# Patient Record
Sex: MALE | Race: WHITE | NOT HISPANIC OR LATINO | Employment: OTHER | URBAN - METROPOLITAN AREA
[De-identification: names, ages, dates, MRNs, and addresses within clinical notes are randomized per-mention and may not be internally consistent; named-entity substitution may affect disease eponyms.]

---

## 2022-03-09 ENCOUNTER — TELEPHONE (OUTPATIENT)
Dept: RADIATION ONCOLOGY | Facility: HOSPITAL | Age: 72
End: 2022-03-09

## 2022-03-11 ENCOUNTER — HOSPITAL ENCOUNTER (OUTPATIENT)
Dept: RADIATION ONCOLOGY | Facility: HOSPITAL | Age: 72
Setting detail: RADIATION/ONCOLOGY SERIES
End: 2022-03-11

## 2022-03-14 ENCOUNTER — TELEPHONE (OUTPATIENT)
Dept: RADIATION ONCOLOGY | Facility: HOSPITAL | Age: 72
End: 2022-03-14

## 2022-03-14 PROBLEM — C61 STAGE IV ADENOCARCINOMA OF PROSTATE: Status: ACTIVE | Noted: 2022-03-14

## 2022-03-14 PROBLEM — Z19.2 METASTATIC CASTRATION-RESISTANT ADENOCARCINOMA OF PROSTATE: Status: ACTIVE | Noted: 2022-03-14

## 2022-03-14 PROBLEM — C79.51 SECONDARY MALIGNANT NEOPLASM OF BONE: Status: ACTIVE | Noted: 2022-03-14

## 2022-03-14 RX ORDER — TAMSULOSIN HYDROCHLORIDE 0.4 MG/1
1 CAPSULE ORAL DAILY
COMMUNITY

## 2022-03-14 RX ORDER — PROMETHAZINE HYDROCHLORIDE 25 MG/1
25 TABLET ORAL EVERY 6 HOURS PRN
COMMUNITY

## 2022-03-14 RX ORDER — ATORVASTATIN CALCIUM 20 MG/1
20 TABLET, FILM COATED ORAL DAILY
COMMUNITY

## 2022-03-14 RX ORDER — SILDENAFIL 100 MG/1
100 TABLET, FILM COATED ORAL DAILY PRN
COMMUNITY

## 2022-03-14 NOTE — PROGRESS NOTES
RADIATION THERAPY CONSULT NOTE    NAME: Rishi Yap  YOB: 1950  MRN #: [id number]  DATE OF SERVICE: 3/15/2022  REFERRING PROVIDER: Samson DUNN APRN  PRIMARY CARE PROVIDER: Alba Og MD    DIAGNOSIS:  Stage IV Prostate Adenocarcinoma, Andry 10 (5+5), 10/10 cores high volumes disease  Encounter Diagnoses   Name Primary?   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  I was asked to see the patient at the request of the referring provider noted below for advice and recommendations regarding this diagnosis and the role of radiation therapy.                              REQUESTING PHYSICIAN:  VIGNESH Beth ([facility])    RECORDS OBTAINED:  Records of the patients history including those obtained from the referring provider were reviewed and summarized in detail.    HISTORY OF PRESENT ILLNESS:  Rishi Yap is a 71 y.o. male with known stage IV Prostate cancer, GS 10 and LUTS.  He had an excellent response to hormones and also was started on Taxotere but had a severe allergic reaction with the first dose; in 11/22/21 he had received carboPt AUC 5 q3k and tolerated ok. He completed a total of 4 cycles.    PSA Timeline ([facility]):  04/21/2010   3.50 ng/mL  08/03/2020   70.3 ng/mL  Started ADT  10/05/2020   7.52 ng/mL  01/11/2021   9.06 ng/mL  02/18/2021   3.94 ng/mL  04/19/2021   0.42 ng/mL  07/19/2021   0.16 ng/mL  10/13/2021   1.08 ng/mL  12/27/2021   3.70 ng/mL  02/24/2022   5.53 ng/mL    CT CAP 2/24/2022: Noted a mixed nodular soft tissue and fat density lesion within the left paraspinal musculature centered at the level of L2, enlarging and now measuring 6.6 cm.    Stable subcm RP nodes.  Sclerotic lesions throughout the lumbar spine, sacrum, pelvic bones and proximal femurs noted.    His PSA increased 2/24/2022 as noted representing  castrate resistance.  He was having pain/grwoth in the paraspinal mass/musculature and recommended palliative XRT.    He is being switched to Enzalutamide 160 mg daily and continues on Eligard.    The following portions of the patient's history were reviewed and updated as appropriate: allergies, current medications, past family history, past medical history, past social history, past surgical history and problem list. Reviewed with the patient and remain unchanged.    PAST MEDICAL HISTORY:  he has a past medical history of Alcohol dependence (HCC), Basal cell carcinoma of skin, Bilateral hearing loss, Encounter for palliative care, Hyperlipidemia, Hypertension, Malignant neoplasm of prostate (HCC), Personal history of colonic polyps, and Secondary malignant neoplasm of bone (HCC).    MEDICATIONS:    Current Outpatient Medications:   •  atorvastatin (LIPITOR) 20 MG tablet, Take 20 mg by mouth Daily., Disp: , Rfl:   •  metoprolol tartrate (LOPRESSOR) 25 MG tablet, Take 12.5 mg by mouth 2 (Two) Times a Day., Disp: , Rfl:   •  promethazine (PHENERGAN) 25 MG tablet, Take 25 mg by mouth Every 6 (Six) Hours As Needed for Nausea or Vomiting., Disp: , Rfl:   •  sildenafil (VIAGRA) 100 MG tablet, Take 100 mg by mouth Daily As Needed for Erectile Dysfunction., Disp: , Rfl:   •  tamsulosin (FLOMAX) 0.4 MG capsule 24 hr capsule, Take 1 capsule by mouth Daily., Disp: , Rfl:     ALLERGIES:    Allergies   Allergen Reactions   • Taxotere [Docetaxel] Anaphylaxis   • Penicillins Rash       PAST SURGICAL HISTORY:  Vasectomy and appendectomy    PREVIOUS RADIOTHERAPY OR CHEMOTHERAPY:  Chemotherapy; no prior XRT    FAMILY HISTORY:  hisfamily history includes Stomach cancer in his mother.    SOCIAL HISTORY:  he reports that he has never smoked. He has never used smokeless tobacco. He reports current alcohol use of about 24.0 standard drinks of alcohol per week. He reports that he does not use drugs.    PAIN AND PAIN MANAGEMENT:  Pain 2/10  in mid L spine.  Vitals:    03/15/22 1004   BP: 125/71   Pulse: 75   Resp: 16   SpO2: 99%   Weight: 105 kg (231 lb 12.8 oz)   PainSc:   2       NUTRITIONAL STATUS:   no issues    KPS:  80:  Normal activity with effort; some signs or symptoms  PHQ-9 Total Score: distress tool completed.    REVIEW OF SYSTEMS: pain is noted.  General: No fevers, chills, weight change, or drenching night sweats. Skin: No rashes or jaundice.  HEENT: No change in vision or hearing, no headaches.  Neck: No dysphagia or masses.  Heme/Lymph: No easy bruising or bleeding.  Respiratory System: No shortness of breath or cough.  Cardiovascular: No chest pain, palpitations, or dyspnea on exertion.  - Pacemaker. GI: No nausea, vomiting, diarrhea, melena, or hematochezia.  : No dysuria or hematuria.  Endocrine: No heat or cold intolerance. Musculoskeletal: No myalgias or arthralgias.  Neuro: No weakness, numbness, syncope, or seizures. Psych: No mood changes or depression. Ext: Denies swelling.      Objective   VITAL SIGNS:  Vitals:    03/15/22 1004   BP: 125/71   Pulse: 75   Resp: 16   SpO2: 99%   Weight: 105 kg (231 lb 12.8 oz)   PainSc:   2       PHYSICAL EXAM:  GENERAL:  No apparent distress. Sitting comfortably in room.    HEENT:  Normocephalic, atraumatic. Pupils are equal, round, reactive to light. Sclera anicteric. Conjunctiva not injected. Oropharynx without erythema, ulcerations or thrush.   NECK:  Supple with no masses.  LYMPHATIC:  No cervical, supraclavicular or axillary adenopathy appreciated bilaterally.   CARDIOVASCULAR:  S1 & S2 detected; no murmurs, rubs or gallops.  CHEST:  Clear to auscultation bilaterally; no wheezes, crackles or rubs. Work of breathing normal.  ABDOMEN:  Bowel sounds present. Abdomen is soft, nontender, nondistended.   MUSCULOSKELETAL:  No tenderness to palpation along the spine or scapulae. Normal range of motion.  EXTREMITIES:  No clubbing, cyanosis, edema.  SKIN:  No erythema, rashes, ulcerations noted.    NEUROLOGIC:  Cranial nerves II-XII grossly intact bilaterally. No focal neurologic deficits.  PSYCHIATRIC:  Alert, aware, and appropriate.      PERTINENT IMAGING/PATHOLOGY/LABS (Medical Decision Making):      COORDINATION OF CARE:  A copy of this note is sent to the referring provider.    PATHOLOGY (Reviewed): as noted above.    IMAGING (Reviewed): independently reviewed, and shared with the patient.    LABS (Reviewed): as noted above.    Assessment/Plan   ASSESSMENT AND PLAN:    1. 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 Secondary malignant neoplasm of bone (HCC)       -Palliative XRT recommended--pain and growth in L2/soft tissue area as noted.  -CT sim  And 30 Gy in 10 fractions recommended.    No  or GI issues. No pain in hips and ambulating great.  Chemo resolved most of his symptoms--we could offer radiation therapy to the area but improved and asymptomatic.  Also starting Xtandi.    Dosing 30 Gy due to soft tissue component as well.    This assessment comes from my review of the imaging, pathology, physician notes and other pertinent information as mentioned.    DISPOSITION:  CT simulation on 03/16/2022.      TIME SPENT WITH PATIENT: I spent 45 minutes caring for Rishi on this date of service. This time includes time spent by me in the following activities: preparing for the visit, reviewing tests, obtaining and/or reviewing a separately obtained history, performing a medically appropriate examination and/or evaluation, counseling and educating the patient/family/caregiver, documenting information in the medical record and care coordination         CC:  Alba Og MD Robin Szczapinski, APRN John A Cox, MD  3/15/2022  9:19 AM EDT

## 2022-03-15 ENCOUNTER — CONSULT (OUTPATIENT)
Dept: RADIATION ONCOLOGY | Facility: HOSPITAL | Age: 72
End: 2022-03-15

## 2022-03-15 VITALS
SYSTOLIC BLOOD PRESSURE: 125 MMHG | DIASTOLIC BLOOD PRESSURE: 71 MMHG | OXYGEN SATURATION: 99 % | WEIGHT: 231.8 LBS | HEART RATE: 75 BPM | RESPIRATION RATE: 16 BRPM

## 2022-03-15 DIAGNOSIS — C61 METASTATIC CASTRATION-RESISTANT ADENOCARCINOMA OF PROSTATE: Primary | ICD-10-CM

## 2022-03-15 DIAGNOSIS — Z19.2 METASTATIC CASTRATION-RESISTANT ADENOCARCINOMA OF PROSTATE: Primary | ICD-10-CM

## 2022-03-15 DIAGNOSIS — C79.51 SECONDARY MALIGNANT NEOPLASM OF BONE: ICD-10-CM

## 2022-03-15 PROCEDURE — 99204 OFFICE O/P NEW MOD 45 MIN: CPT | Performed by: RADIOLOGY

## 2022-03-15 PROCEDURE — G0463 HOSPITAL OUTPT CLINIC VISIT: HCPCS | Performed by: RADIOLOGY

## 2022-03-16 ENCOUNTER — HOSPITAL ENCOUNTER (OUTPATIENT)
Dept: RADIATION ONCOLOGY | Facility: HOSPITAL | Age: 72
Discharge: HOME | End: 2022-03-16

## 2022-03-16 PROCEDURE — 77290 THER RAD SIMULAJ FIELD CPLX: CPT | Performed by: RADIOLOGY

## 2022-03-16 PROCEDURE — 77334 RADIATION TREATMENT AID(S): CPT | Performed by: RADIOLOGY

## 2022-03-23 PROCEDURE — 77334 RADIATION TREATMENT AID(S): CPT | Performed by: RADIOLOGY

## 2022-03-23 PROCEDURE — 77295 3-D RADIOTHERAPY PLAN: CPT | Performed by: RADIOLOGY

## 2022-03-23 PROCEDURE — 77300 RADIATION THERAPY DOSE PLAN: CPT | Performed by: RADIOLOGY

## 2022-03-23 PROCEDURE — 77263 THER RADIOLOGY TX PLNG CPLX: CPT | Performed by: RADIOLOGY

## 2022-03-24 ENCOUNTER — HOSPITAL ENCOUNTER (OUTPATIENT)
Dept: RADIATION ONCOLOGY | Facility: HOSPITAL | Age: 72
Discharge: HOME | End: 2022-03-24

## 2022-03-24 PROCEDURE — 77427 RADIATION TX MANAGEMENT X5: CPT | Performed by: RADIOLOGY

## 2022-03-24 PROCEDURE — 77280 THER RAD SIMULAJ FIELD SMPL: CPT | Performed by: RADIOLOGY

## 2022-03-24 PROCEDURE — 77412 RADIATION TX DELIVERY LVL 3: CPT | Performed by: RADIOLOGY

## 2022-03-25 ENCOUNTER — HOSPITAL ENCOUNTER (OUTPATIENT)
Dept: RADIATION ONCOLOGY | Facility: HOSPITAL | Age: 72
Discharge: HOME | End: 2022-03-25

## 2022-03-25 PROCEDURE — G6002 STEREOSCOPIC X-RAY GUIDANCE: HCPCS | Performed by: RADIOLOGY

## 2022-03-25 PROCEDURE — 77387 GUIDANCE FOR RADJ TX DLVR: CPT | Performed by: RADIOLOGY

## 2022-03-25 PROCEDURE — 77412 RADIATION TX DELIVERY LVL 3: CPT | Performed by: RADIOLOGY

## 2022-03-28 ENCOUNTER — HOSPITAL ENCOUNTER (OUTPATIENT)
Dept: RADIATION ONCOLOGY | Facility: HOSPITAL | Age: 72
Discharge: HOME | End: 2022-03-28

## 2022-03-28 PROCEDURE — G6002 STEREOSCOPIC X-RAY GUIDANCE: HCPCS | Performed by: RADIOLOGY

## 2022-03-28 PROCEDURE — 77412 RADIATION TX DELIVERY LVL 3: CPT | Performed by: RADIOLOGY

## 2022-03-28 PROCEDURE — 77387 GUIDANCE FOR RADJ TX DLVR: CPT | Performed by: RADIOLOGY

## 2022-03-28 NOTE — PROGRESS NOTES
Patient Name: Rishi Yap Date: 3/29/2022       : 1950        MRN #: [id number] Diagnosis: No diagnosis found.                  RADIATION ON TREATMENT VISIT NOTE - GENERAL     Treatment Summary    General:           Review of Systems    [] No new complaints [] Cough [] Dysphagia  [] Bowel changes [] Fever/chills [] Nausea/vomiting  [] Skin itching/soreness/breakdown  [x] Fatigue,  severity: 1 Relief w/ Rest [x] Pain,  severity: 4, location: back, hip      Nausea regimen: NONE   Pain medication regimen: NONE   Bowel regimen: NONE   Skin regimen: NONE     Comments/Notes:  Patient is doing well with treatments so far, he stated that he is still in pain in his lower back and hip, but it does seem to be getting better. He has no other concerns or complaints at this time.    KPS: 70%       Vital Signs: There were no vitals taken for this visit.    Weight:   Wt Readings from Last 3 Encounters:   03/15/22 105 kg (231 lb 12.8 oz)       Medication:   Current Outpatient Medications:   •  atorvastatin (LIPITOR) 20 MG tablet, Take 20 mg by mouth Daily., Disp: , Rfl:   •  metoprolol tartrate (LOPRESSOR) 25 MG tablet, Take 12.5 mg by mouth 2 (Two) Times a Day., Disp: , Rfl:   •  promethazine (PHENERGAN) 25 MG tablet, Take 25 mg by mouth Every 6 (Six) Hours As Needed for Nausea or Vomiting., Disp: , Rfl:   •  sildenafil (VIAGRA) 100 MG tablet, Take 100 mg by mouth Daily As Needed for Erectile Dysfunction., Disp: , Rfl:   •  tamsulosin (FLOMAX) 0.4 MG capsule 24 hr capsule, Take 1 capsule by mouth Daily., Disp: , Rfl:        LABS (Reviewed):  Hematology No results found for: WBC, RBC, HGB, HCT, PLT   Chemistry No results found for: GLUCOSE, BUN, CREATININE, EGFRIFNONA, EGFRIFAFRI, BCR, K, CO2, CALCIUM, PROTENTOTREF, ALBUMIN, LABIL2, BILIRUBIN, AST, ALT      Physical Exam:         Neck: [] Lymphadenopathy  Lungs: [] Clear to auscultation  CVS: [] Regular rate & rhythm  Skin: [x] stGstrstastdstest:st st1st Comments/Notes: The patient  reports that he is having pain in his back and hips in the areas that we are treating.  He takes hydrocodone but is leery to take it as he is afraid of becoming addicted.  I told him he should take it as necessary.  He reports he has difficulty sleeping at night because of the pain and I told him to take one of his hydrocodone before he goes to bed.  He is also taking a stool softener/laxative.  [x] Review of labs, images, dosimetry, dose delivered, & treatment parameters.   Comments:     [x] Patient treatment setup reviewed.    Comments:     Recommendations:     [x] Continue RT  [] Change RT Plan [] Hold RT, length:        Approved Electronically By:  Derik Cuellar MD, 3/28/2022, 14:49 EDT          Confidentiality of this medical record shall be maintained except when use or disclosure is required or permitted by law, regulation or written authorization by the patient.

## 2022-03-29 ENCOUNTER — RADIATION ONCOLOGY WEEKLY ASSESSMENT (OUTPATIENT)
Dept: RADIATION ONCOLOGY | Facility: HOSPITAL | Age: 72
End: 2022-03-29

## 2022-03-29 ENCOUNTER — HOSPITAL ENCOUNTER (OUTPATIENT)
Dept: RADIATION ONCOLOGY | Facility: HOSPITAL | Age: 72
Discharge: HOME | End: 2022-03-29

## 2022-03-29 VITALS
DIASTOLIC BLOOD PRESSURE: 64 MMHG | OXYGEN SATURATION: 97 % | WEIGHT: 227.8 LBS | SYSTOLIC BLOOD PRESSURE: 102 MMHG | HEART RATE: 82 BPM | RESPIRATION RATE: 18 BRPM | TEMPERATURE: 96.7 F

## 2022-03-29 DIAGNOSIS — C79.51 SECONDARY MALIGNANT NEOPLASM OF BONE: Primary | ICD-10-CM

## 2022-03-29 PROCEDURE — 77387 GUIDANCE FOR RADJ TX DLVR: CPT | Performed by: RADIOLOGY

## 2022-03-29 PROCEDURE — G6002 STEREOSCOPIC X-RAY GUIDANCE: HCPCS | Performed by: RADIOLOGY

## 2022-03-29 PROCEDURE — 77412 RADIATION TX DELIVERY LVL 3: CPT | Performed by: RADIOLOGY

## 2022-03-30 ENCOUNTER — HOSPITAL ENCOUNTER (OUTPATIENT)
Dept: RADIATION ONCOLOGY | Facility: HOSPITAL | Age: 72
Discharge: HOME | End: 2022-03-30

## 2022-03-30 PROCEDURE — 77336 RADIATION PHYSICS CONSULT: CPT | Performed by: RADIOLOGY

## 2022-03-30 PROCEDURE — 77412 RADIATION TX DELIVERY LVL 3: CPT | Performed by: RADIOLOGY

## 2022-03-30 PROCEDURE — G6002 STEREOSCOPIC X-RAY GUIDANCE: HCPCS | Performed by: RADIOLOGY

## 2022-03-30 PROCEDURE — 77387 GUIDANCE FOR RADJ TX DLVR: CPT | Performed by: RADIOLOGY

## 2022-03-31 PROCEDURE — 77387 GUIDANCE FOR RADJ TX DLVR: CPT | Performed by: RADIOLOGY

## 2022-03-31 PROCEDURE — G6002 STEREOSCOPIC X-RAY GUIDANCE: HCPCS | Performed by: RADIOLOGY

## 2022-03-31 PROCEDURE — 77412 RADIATION TX DELIVERY LVL 3: CPT | Performed by: RADIOLOGY

## 2022-03-31 PROCEDURE — 77427 RADIATION TX MANAGEMENT X5: CPT | Performed by: RADIOLOGY

## 2022-04-01 ENCOUNTER — HOSPITAL ENCOUNTER (OUTPATIENT)
Dept: RADIATION ONCOLOGY | Facility: HOSPITAL | Age: 72
Discharge: HOME | End: 2022-04-01

## 2022-04-01 ENCOUNTER — HOSPITAL ENCOUNTER (OUTPATIENT)
Dept: RADIATION ONCOLOGY | Facility: HOSPITAL | Age: 72
Setting detail: RADIATION/ONCOLOGY SERIES
End: 2022-04-01

## 2022-04-01 PROCEDURE — 77412 RADIATION TX DELIVERY LVL 3: CPT | Performed by: RADIOLOGY

## 2022-04-01 PROCEDURE — 77387 GUIDANCE FOR RADJ TX DLVR: CPT | Performed by: RADIOLOGY

## 2022-04-01 PROCEDURE — G6002 STEREOSCOPIC X-RAY GUIDANCE: HCPCS | Performed by: RADIOLOGY

## 2022-04-04 ENCOUNTER — HOSPITAL ENCOUNTER (OUTPATIENT)
Dept: RADIATION ONCOLOGY | Facility: HOSPITAL | Age: 72
Discharge: HOME | End: 2022-04-04

## 2022-04-04 PROCEDURE — G6002 STEREOSCOPIC X-RAY GUIDANCE: HCPCS | Performed by: RADIOLOGY

## 2022-04-04 PROCEDURE — 77387 GUIDANCE FOR RADJ TX DLVR: CPT | Performed by: RADIOLOGY

## 2022-04-04 PROCEDURE — 77412 RADIATION TX DELIVERY LVL 3: CPT | Performed by: RADIOLOGY

## 2022-04-04 NOTE — PROGRESS NOTES
Patient Name: Rishi Yap Date: 2022       : 1950        MRN #: [id number] Diagnosis: Stage IV Prostate Adenocarcinoma, Andry 10 (5+5), 10/10 cores high volumes disease                  RADIATION ON TREATMENT VISIT NOTE - GENERAL     Treatment Summary    Treatment Site Ref. ID Energy Dose/Fx (cGy) #Fx Dose Correction (cGy) Total Dose (cGy) Start Date End Date Elapsed Days   L1-3 Spine L1-3 Spine 18X 300 9 / 10 0 2,700 3/24/2022 2022 12       General:           Review of Systems    [x] No new complaints [] Cough [] Dysphagia  [] Bowel changes [] Fever/chills [] Nausea/vomiting  [] Skin itching/soreness/breakdown  [] Fatigue,  severity: ---------------- [x] Pain,  severity: 4, location:       Nausea regimen: NONE   Pain medication regimen: NONE   Bowel regimen: NONE   Skin regimen: Aquaphor     Comments/Notes:  Patient is doing well with treatments so far, he finishes tomorrow. He does not have any concerns or complaints at this time. He says that his pain is decreased from an 8 to a 4.    KPS: 80%       Vital Signs: There were no vitals taken for this visit.    Weight:   Wt Readings from Last 3 Encounters:   22 103 kg (227 lb 12.8 oz)   03/15/22 105 kg (231 lb 12.8 oz)       Medication:   Current Outpatient Medications:   •  atorvastatin (LIPITOR) 20 MG tablet, Take 20 mg by mouth Daily., Disp: , Rfl:   •  metoprolol tartrate (LOPRESSOR) 25 MG tablet, Take 12.5 mg by mouth 2 (Two) Times a Day., Disp: , Rfl:   •  promethazine (PHENERGAN) 25 MG tablet, Take 25 mg by mouth Every 6 (Six) Hours As Needed for Nausea or Vomiting., Disp: , Rfl:   •  sildenafil (VIAGRA) 100 MG tablet, Take 100 mg by mouth Daily As Needed for Erectile Dysfunction., Disp: , Rfl:   •  tamsulosin (FLOMAX) 0.4 MG capsule 24 hr capsule, Take 1 capsule by mouth Daily., Disp: , Rfl:        LABS (Reviewed):  Hematology No results found for: WBC, RBC, HGB, HCT, PLT   Chemistry No results found for: GLUCOSE, BUN, CREATININE,  EGFRIFNONA, EGFRIFAFRI, BCR, K, CO2, CALCIUM, PROTENTOTREF, ALBUMIN, LABIL2, BILIRUBIN, AST, ALT      Physical Exam:         Neck: [] Lymphadenopathy  Lungs: [] Clear to auscultation  CVS: [] Regular rate & rhythm  Skin: [x] ndGndrndanddndend:nd nd2nd Comments/Notes:     [x] Review of labs, images, dosimetry, dose delivered, & treatment parameters.   Comments:     [x] Patient treatment setup reviewed.    Comments:     Recommendations:     [x] Continue RT  [] Change RT Plan [] Hold RT, length:        Approved Electronically By:  Kenan Yi MD, 4/4/2022, 10:51 EDT          Confidentiality of this medical record shall be maintained except when use or disclosure is required or permitted by law, regulation or written authorization by the patient.

## 2022-04-04 NOTE — PATIENT INSTRUCTIONS
RADIATION THERAPY DISCHARGE INSTRUCTIONS  General    CONGRATULATIONS! You completed 10 radiation treatments for treatment of your malignant neoplasm of bone cancer. These discharge instructions are important for you to follow until your one-month follow up appointment with Dr. Pawel Hankins. Please make sure to review these instructions and call the Radiation Oncology Department if you have any questions or concerns with symptoms you may experience. Thank you for trusting us with your cancer treatment!    DIET  Eat a regular, well balanced diet that is high in protein such as meat, eggs, cheese, and nut butters  Drink 48 to 64 ounces of fluid daily  Use nutritional supplements if you are not able to eat full meals  Monitor your weight and report continued weight loss to your doctor    MEDICATIONS  Use Tylenol as needed to decrease discomfort and irritation to treatment area  Take pain medications only as prescribed  Take a laxative or stool softener as needed to prevent constipation due to pain medications    SKIN CARE  Wash treated skin gently with your hands using a mild, non-drying soap such as Dove® or Aveeno® until skin returns to normal  Pat skin dry - do not rub  Keep treated skin moist with frequent applications of Aquaphor® or unscented lotion (such as Eucerin)®  Always protect your treated skin when outdoors by wearing protective clothing and applying sunscreen SPF 15 or higher at least 30 minutes before going outdoors and reapply frequently    ACTIVITY  Fatigue is a normal side effect of radiation therapy and should improve over time  Alternate rest and activity  Exercise such as walking may help to improve your fatigue    FOLLOW-UP  Continue follow-up appointments with all other doctors as necessary  Call your radiation oncology doctor if you are concerned with any side effects you are experiencing: [phone]    _______________________________________________________________________    Completed by:  Genevieve Kelly RN BSN, Radiation Oncology Nurse on  04/06/2022 at 11:06 EDT

## 2022-04-04 NOTE — PROGRESS NOTES
RADIATION THERAPY DISCHARGE INSTRUCTIONS    Rishi Yap completed 3000 cGy in 10 fractions for treatment of malignant neoplasm of bone. The following instructions were provided to the patient and/or family in their printed after visit summary (AVS) as well as discussed in-person with the radiation oncology nurse. The patient and/or family member had the opportunity to ask questions and verbalized their questions were adequately answered. Patient is scheduled for one-month follow-up appointment with Dr. Pawel Hankins on  at .     DIET  [x]  Eat a regular, well balanced diet that is high in protein such as meat, eggs, cheese, and nut butters  [x]  Drink 48 to 64 ounces of fluid daily  []  Drink lots of fluids to help decrease thick oral secretions  [x]  Use nutritional supplements if you are not able to eat full meals  [x]  Monitor your weight and report continued weight loss to your doctor    MEDICATIONS  [x]  Use Tylenol as needed to decrease discomfort and irritation to treatment area  []  Use Tylenol as needed to decrease breast discomfort and irritation due to swelling and skin reaction  [x]  Take pain medications only as prescribed  [x]  Take a laxative or stool softener as needed to prevent constipation due to pain medications  []  Use a synthetic saliva product (such as Salivart®, Glandosane®, or MouthKote®) as needed to alleviate dry mouth or throat  []  Use Lisa's Magic Mouthwash or other oral pain relief medication before meals and before taking medications as needed to ease the discomfort of swallowing  []  Use an over-the-counter decongestant (such as Sudafed®) if your ears feel plugged  []  Use Imodium (up to 8 pills per day) as needed for loose stools    SKIN CARE  [x]  Wash treated skin gently with your hands using a mild, non-drying soap such as Dove® or Aveeno® until skin returns to normal  [x]  Pat skin dry - do not rub  [x]  Keep treated skin moist with frequent applications of Aquaphor® or  unscented lotion (such as Eucerin)®  [x]  Always protect your treated skin when outdoors by wearing protective clothing and applying sunscreen SPF 15 or higher at least 30 minutes before going outdoors and reapply frequently  []  Resume use of deodorant to the armpit of the affected arm when skin reactions improve    ORAL CARE  []  Continue oral rinses as directed until mouth soreness goes away  []  Avoid using commercial mouthwash that contains alcohol  []  Rinse mouth with salt and soda solution: 2 teaspoons salt and 2 teaspoons baking soda dissolved in 8 ounces warm water  []  Perform oral care twice daily and after each meal using a soft or extra soft toothbrush  []  Continue regular visits to your dentist and follow guidelines to care for your teeth    ACTIVITY  [x]  Fatigue is a normal side effect of radiation therapy and should improve over time  [x]  Alternate rest and activity  [x]  Exercise such as walking may help to improve your fatigue    FOLLOW-UP  [x]  Continue follow-up appointments with all other doctors as necessary  []  Continue to have regular mammograms as ordered by your physician  []  Ensure you have a PSA level drawn at [facility] laboratory (no appointment necessary) at least two days prior to your one month follow-up appointment with Dr. Hankins  [x]  Call your radiation oncology doctor if you are concerned with any side effects you are experiencing: [phone]  []  Call your radiation oncology doctor if you are concerned with any side effects you are experiencing, such as increased shortness of breath, fevers or chills, night sweats, increased coughing or new cough, blood in your sputum, or difficulty swallowing: [phone]    SPECIAL INSTRUCTIONS  []  Perform self-breast exams monthly (see below)  []  Continue all range of motion and mobility exercise as instructed (if applicable)  []  Never allow blood draws or blood pressures to be taken on your affected arm unless in an  emergency situation (if applicable)  []  Practice careful nail care and avoid open skin to your affected arm and hand  []  Call your physician if you notice swelling of your affected arm or hand that is unusual or doesn't go away  []  Continue performing the on-treatment skin care routine recommended by your radiation oncology until your 1-month follow-up appointment  []  Do not stop taking your steroid medication suddenly; your medical or radiation oncologist will slowly decrease your dose to prevent any adverse effects.  []  Do not drive until your doctor has said it's okay to do so  []  Reintroduce fiber into your diet slowly so you will know which foods cause loose stools or cramps  []  Use sitz baths as directed  []  Side effects should subside in a couple weeks; tell your doctor if you have increased burning, frequency, or blood in your urine or stool  []  Notify your medical or radiation oncologist if you notice any white patches inside your mouth or on your tongue, with or without foul taste. This could be a yeast infection and prompt treatment is important.  _______________________________________________________________________    Completed by: Genevieve Kelly RN BSN, Radiation Oncology Nurse on 04/06/2022  at 11:07 EDT   Encounter Administratively Closed by Health Information Management

## 2022-04-05 ENCOUNTER — HOSPITAL ENCOUNTER (OUTPATIENT)
Dept: RADIATION ONCOLOGY | Facility: HOSPITAL | Age: 72
Discharge: HOME | End: 2022-04-05

## 2022-04-05 ENCOUNTER — RADIATION ONCOLOGY WEEKLY ASSESSMENT (OUTPATIENT)
Dept: RADIATION ONCOLOGY | Facility: HOSPITAL | Age: 72
End: 2022-04-05

## 2022-04-05 VITALS
HEART RATE: 72 BPM | DIASTOLIC BLOOD PRESSURE: 66 MMHG | OXYGEN SATURATION: 96 % | SYSTOLIC BLOOD PRESSURE: 103 MMHG | RESPIRATION RATE: 19 BRPM | TEMPERATURE: 97.1 F | WEIGHT: 220.8 LBS

## 2022-04-05 DIAGNOSIS — Z19.2 METASTATIC CASTRATION-RESISTANT ADENOCARCINOMA OF PROSTATE: Primary | ICD-10-CM

## 2022-04-05 DIAGNOSIS — C61 METASTATIC CASTRATION-RESISTANT ADENOCARCINOMA OF PROSTATE: Primary | ICD-10-CM

## 2022-04-05 PROCEDURE — 77412 RADIATION TX DELIVERY LVL 3: CPT | Performed by: RADIOLOGY

## 2022-04-05 PROCEDURE — 77387 GUIDANCE FOR RADJ TX DLVR: CPT | Performed by: RADIOLOGY

## 2022-04-05 PROCEDURE — G6002 STEREOSCOPIC X-RAY GUIDANCE: HCPCS | Performed by: RADIOLOGY

## 2022-04-06 ENCOUNTER — RADIATION ONCOLOGY WEEKLY ASSESSMENT (OUTPATIENT)
Dept: RADIATION ONCOLOGY | Facility: HOSPITAL | Age: 72
End: 2022-04-06
Payer: OTHER GOVERNMENT

## 2022-04-06 ENCOUNTER — HOSPITAL ENCOUNTER (OUTPATIENT)
Dept: RADIATION ONCOLOGY | Facility: HOSPITAL | Age: 72
Discharge: HOME | End: 2022-04-06

## 2022-04-06 DIAGNOSIS — Z02.9 ENCOUNTER FOR ADMINISTRATIVE EXAMINATIONS, UNSPECIFIED: Primary | ICD-10-CM

## 2022-04-06 PROCEDURE — 77336 RADIATION PHYSICS CONSULT: CPT | Performed by: RADIOLOGY

## 2022-04-06 PROCEDURE — 77412 RADIATION TX DELIVERY LVL 3: CPT | Performed by: RADIOLOGY

## 2022-04-06 PROCEDURE — G6002 STEREOSCOPIC X-RAY GUIDANCE: HCPCS | Performed by: RADIOLOGY

## 2022-04-06 PROCEDURE — 77387 GUIDANCE FOR RADJ TX DLVR: CPT | Performed by: RADIOLOGY

## 2022-05-04 ENCOUNTER — HOSPITAL ENCOUNTER (OUTPATIENT)
Dept: RADIATION ONCOLOGY | Facility: HOSPITAL | Age: 72
Setting detail: RADIATION/ONCOLOGY SERIES
End: 2022-05-04

## 2022-05-04 ENCOUNTER — TELEPHONE (OUTPATIENT)
Dept: RADIATION ONCOLOGY | Facility: HOSPITAL | Age: 72
End: 2022-05-04

## 2022-05-06 ENCOUNTER — OFFICE VISIT (OUTPATIENT)
Dept: RADIATION ONCOLOGY | Facility: HOSPITAL | Age: 72
End: 2022-05-06

## 2022-05-06 VITALS
DIASTOLIC BLOOD PRESSURE: 62 MMHG | RESPIRATION RATE: 18 BRPM | SYSTOLIC BLOOD PRESSURE: 94 MMHG | HEART RATE: 71 BPM | OXYGEN SATURATION: 97 % | WEIGHT: 216 LBS | TEMPERATURE: 97.1 F

## 2022-05-06 DIAGNOSIS — C61 METASTATIC CASTRATION-RESISTANT ADENOCARCINOMA OF PROSTATE: Primary | ICD-10-CM

## 2022-05-06 DIAGNOSIS — Z19.2 METASTATIC CASTRATION-RESISTANT ADENOCARCINOMA OF PROSTATE: Primary | ICD-10-CM

## 2022-05-06 PROCEDURE — G0463 HOSPITAL OUTPT CLINIC VISIT: HCPCS

## 2022-05-06 PROCEDURE — FACE2FACE: Performed by: RADIOLOGY
